# Patient Record
(demographics unavailable — no encounter records)

---

## 2025-02-22 NOTE — LETTER BODY
[Dear  ___] : Dear  [unfilled], [Consult Letter:] : I had the pleasure of evaluating your patient, [unfilled]. [Please see my note below.] : Please see my note below. [Sincerely,] : Sincerely, [FreeTextEntry3] : Vidya Crews MD, FACS

## 2025-02-22 NOTE — ASSESSMENT
[FreeTextEntry1] : 67 yo referred for an elevated PSA the patient states that his 2 brothers have prostate cancer - older brothers none  of cancer but had their cancers treated  referred for a dramatic rise in PSA denies prior biopsy denies prior imaging   QUest 2025 PSA 5.3 ng/ml / 9% UA - no RBCs 2024 PSA 2.7 ng/ml / 11% free  nl UA   IPSS 2 IIEF 19  Plan 67 yo with rising PSA family history of prostate cancer no prior imaging or biopsy - MRI of the prostate - renal and bladder US - repeat PSA - f/u to review and plan biopsy (MRI guided or standard)

## 2025-02-22 NOTE — ASSESSMENT
[FreeTextEntry1] : 69 yo referred for an elevated PSA the patient states that his 2 brothers have prostate cancer - older brothers none  of cancer but had their cancers treated  referred for a dramatic rise in PSA denies prior biopsy denies prior imaging   QUest 2025 PSA 5.3 ng/ml / 9% UA - no RBCs 2024 PSA 2.7 ng/ml / 11% free  nl UA   IPSS 2 IIEF 19  Plan 69 yo with rising PSA family history of prostate cancer no prior imaging or biopsy - MRI of the prostate - renal and bladder US - repeat PSA - f/u to review and plan biopsy (MRI guided or standard)

## 2025-02-22 NOTE — HISTORY OF PRESENT ILLNESS
[FreeTextEntry1] : 67 yo referred for an elevated PSA the patient states that his 2 brothers have prostate cancer - older brothers none  of cancer but had their cancers treated  referred for a dramatic rise in PSA denies prior biopsy denies prior imaging   QUest 2025 PSA 5.3 ng/ml / 9% UA - no RBCs 2024 PSA 2.7 ng/ml / 11% free  nl UA   IPSS 2 IIEF 19 [Urinary Retention] : no urinary retention [Urinary Urgency] : no urinary urgency [Urinary Frequency] : no urinary frequency [Nocturia] : no nocturia [Straining] : no straining [Erectile Dysfunction] : no Erectile Dysfunction [Dysuria] : no dysuria [Hematuria - Gross] : no gross hematuria

## 2025-03-16 NOTE — HISTORY OF PRESENT ILLNESS
[FreeTextEntry1] : 67 yo initially referred for an elevated PSA the patient states that his 2 brothers have prostate cancer - older brothers none  of cancer but had their cancers treated  referred for a dramatic rise in PSA here to review repeat labs and imaging   QUest 2025 PSA 4.29 ng/ml / 11% free  2025 PSA 5.3 ng/ml / 9% UA - no RBCs 2024 PSA 2.7 ng/ml / 11% free  nl UA   MRI prostate 2025 LESION: IMPRESSION: Prostate lesion as detailed above. PIRADS 4 - High (clinically significant cancer is likely to be present)  Renal and Bladder US 3/2025 FINDINGS: Right kidney: 10.5 cm. 5 mm lower pole calculus with adjacent cyst. 4 mm mid pole calculus. No hydronephrosis. Left kidney: 11.0 cm. 2.3 cm lower pole simple cyst. 5 mm nonobstructing calculus. Urinary bladder: Within normal limits.  IMPRESSION: Bilateral nonobstructing nephrolithiasis.  [Urinary Retention] : no urinary retention [Urinary Urgency] : no urinary urgency [Urinary Frequency] : no urinary frequency [Nocturia] : no nocturia [Straining] : no straining [Erectile Dysfunction] : no Erectile Dysfunction [Dysuria] : no dysuria [Hematuria - Gross] : no gross hematuria

## 2025-03-16 NOTE — ASSESSMENT
[FreeTextEntry1] : 69 yo initially referred for an elevated PSA the patient states that his 2 brothers have prostate cancer - older brothers none  of cancer but had their cancers treated  referred for a dramatic rise in PSA here to review repeat labs and imaging   QUest 2025 PSA 4.29 ng/ml / 11% free  2025 PSA 5.3 ng/ml / 9% UA - no RBCs 2024 PSA 2.7 ng/ml / 11% free  nl UA   MRI prostate 2025 LESION: IMPRESSION: Prostate lesion as detailed above. PIRADS 4 - High (clinically significant cancer is likely to be present)  Renal and Bladder US 3/2025 FINDINGS: Right kidney: 10.5 cm. 5 mm lower pole calculus with adjacent cyst. 4 mm mid pole calculus. No hydronephrosis. Left kidney: 11.0 cm. 2.3 cm lower pole simple cyst. 5 mm nonobstructing calculus. Urinary bladder: Within normal limits.  IMPRESSION: Bilateral nonobstructing nephrolithiasis.  Plan  69 yo with elevated PSA and PIRADS 4 on MRI US with bilateral renal stones and a simple cyst family history of prostate cancer no prior imaging or biopsy - MRI of the prostate - reviewed - renal and bladder US - reviewed  - repeat PSA reviewed  - MRI guided biopsy - [facility] - f/u to review post biopsy  discussed the finding on US and expressed the need for stone workup and made him aware of what to look for and what to do in cases of renal colic

## 2025-04-23 NOTE — ASSESSMENT
[FreeTextEntry1] : 67 yo initially referred for an elevated PSA the patient states that his 2 brothers have prostate cancer - older brothers none  of cancer but had their cancers treated  referred for a dramatic rise in PSA here to review his prostate biopsy  QUest 2025 PSA 4.29 ng/ml / 11% free  2025 PSA 5.3 ng/ml / 9% UA - no RBCs 2024 PSA 2.7 ng/ml / 11% free  nl UA   MRI prostate 2025 LESION: IMPRESSION: Prostate lesion as detailed above. PIRADS 4 - High (clinically significant cancer is likely to be present)  Renal and Bladder US 3/2025 FINDINGS: Right kidney: 10.5 cm. 5 mm lower pole calculus with adjacent cyst. 4 mm mid pole calculus. No hydronephrosis. Left kidney: 11.0 cm. 2.3 cm lower pole simple cyst. 5 mm nonobstructing calculus. Urinary bladder: Within normal limits.  IMPRESSION: Bilateral nonobstructing nephrolithiasis.  Biopsy - 4/10/2025 Final Diagnosis  Right medial apex prostate, needle biopsy: --Adenocarcinoma of the prostate, Mason's score 3+4=7 (Grade Group 2) involving 60% of the specimen and 1 of 1 core(s). Mason pattern 4 comprises 5% of the cancer.  Right lateral apex prostate, needle biopsy: --Adenocarcinoma of the prostate, Mason's score 3+4=7 (Grade Group 2) involving  35% of the specimen and 1 of 1 core(s). [place] pattern 4 comprises 5% of the cancer.  . ETHAN: Right posterior medial prostate, needle biopsy: -Adenocarcinoma of the prostate, Mason's score 3+4=7 (Grade Group 2) involving 20% of the specimen and 1 of 3 fragmented cores. [place] pattern 4 comprises 5% of the cancer.  biopsy finings reviewed with the patient   Plan Our discussion summarized --  The natural history of this disease was explained to the patient at length and the treatment options discussed including radical prostatectomy by open or robotic-assisted laparoscopic approach, external-beam radiotherapy, brachytherapy, and watchful waiting, active surveillance, including the probability of success and complications associated with these approaches.  With respect to AS/watchful waiting, we discussed the difficulties of estimating the extent of disease preoperatively, the risk of cancer progression, and risk that salvage might not be possible with progression. However, the favorable long-term outcomes of active surveillance in appropriately selected patients was conveyed  With respect to seed implants, we discussed risks including but not limited to cancer recurrence, exacerbation of voiding symptoms or hematuria, urinary retention, induction of 2nd malignancy, and risks of rectal symptoms or bleeding. We also discussed risks of the anesthesia including but not limited to MI, CVA, DVT, and PE.  With respect to EBRT, we discussed we discussed risks including but not limited to cancer recurrence, exacerbation of voiding symptoms or hematuria, urinary retention, incontinence, stricture of the urinary tract, induction of 2nd malignancy, and risks of rectal symptoms or bleeding. We discussed fact that rectal symptoms may be more common with EBRT than with other treatment modalities. I did convey that the risk of erectile dysfunction was likely lower with EBRT, at least in the short-term.  With radiation therapy, we discussed the difficulties in diagnosing recurrent disease at an early stage due to variability in PSA levels and the fact that most patients are not candidates for local salvage therapy when biochemical recurrence is declared. We also discussed the significant morbidity of local salvage therapy in terms of perioperative complications, erectile dysfunction and urinary incontinence.  With respect to radical prostatectomy, the pros and cons of open vs robotic-assisted laparoscopic prostatectomy were discussed. The complications of this procedure were reviewed with the patient and include (but not limited to) urinary incontinence, erectile dysfunction, infertility, anastomotic stricture, lymphocele, hemorrhage requiring transfusion, rectal, ureteral, or nerve injury, infection, cardiovascular, pulmonary, thromboembolic, and anesthetic complications. For robotic prostatectomy, the additional complications of anastomotic urine leak and small bowel obstruction from adhesions was conveyed. We also discussed the need for a urethral catheter as well as a TITA drain post-operatively.  With surgery, we also discussed the potential advantage over radiation therapy in that biochemical recurrence can be detected at a relatively earlier stage and that salvage radiotherapy is successful in controlling recurrent disease in a substantial proportion of patients. I also conveyed that salvage radiotherapy was associated with a considerably more favorable morbidity profile compared to local salvage therapies for radiorecurrent disease.  We also discussed prostate cryotherapy in detail, including aspects related to the procedure and the outcomes with respect to cancer control, urinary dysfunction, impotence, and morbidity.  All of the patient questions were answered. he favors Radiation over surveillance and surgery  he will be referred to Dr. Aburto - Radiation Oncology - f/u with me after to discuss treatment decision

## 2025-04-23 NOTE — HISTORY OF PRESENT ILLNESS
[FreeTextEntry1] : 67 yo initially referred for an elevated PSA the patient states that his 2 brothers have prostate cancer - older brothers none  of cancer but had their cancers treated  referred for a dramatic rise in PSA here to review his prostate biopsy  QUest 2025 PSA 4.29 ng/ml / 11% free  2025 PSA 5.3 ng/ml / 9% UA - no RBCs 2024 PSA 2.7 ng/ml / 11% free  nl UA   MRI prostate 2025 LESION: IMPRESSION: Prostate lesion as detailed above. PIRADS 4 - High (clinically significant cancer is likely to be present)  Renal and Bladder US 3/2025 FINDINGS: Right kidney: 10.5 cm. 5 mm lower pole calculus with adjacent cyst. 4 mm mid pole calculus. No hydronephrosis. Left kidney: 11.0 cm. 2.3 cm lower pole simple cyst. 5 mm nonobstructing calculus. Urinary bladder: Within normal limits.  IMPRESSION: Bilateral nonobstructing nephrolithiasis.  Biopsy - 4/10/2025 Final Diagnosis  Right medial apex prostate, needle biopsy: --Adenocarcinoma of the prostate, Mason's score 3+4=7 (Grade Group 2) involving 60% of the specimen and 1 of 1 core(s). Mason pattern 4 comprises 5% of the cancer.  Right lateral apex prostate, needle biopsy: --Adenocarcinoma of the prostate, Mason's score 3+4=7 (Grade Group 2) involving  35% of the specimen and 1 of 1 core(s). [place] pattern 4 comprises 5% of the cancer.  . ETHAN: Right posterior medial prostate, needle biopsy: -Adenocarcinoma of the prostate, Mason's score 3+4=7 (Grade Group 2) involving 20% of the specimen and 1 of 3 fragmented cores. [place] pattern 4 comprises 5% of the cancer.  biopsy finings reviewed with the patient  [Urinary Retention] : no urinary retention [Urinary Urgency] : no urinary urgency [Urinary Frequency] : no urinary frequency [Nocturia] : no nocturia [Straining] : no straining [Erectile Dysfunction] : no Erectile Dysfunction [Dysuria] : no dysuria [Hematuria - Gross] : no gross hematuria

## 2025-05-16 NOTE — PHYSICAL EXAM
[General Appearance - Well Developed] : well developed [General Appearance - Alert] : alert [General Appearance - In No Acute Distress] : in no acute distress [Examination Of The Oral Cavity] : the lips and gums were normal [] : no respiratory distress [Musculoskeletal - Swelling] : no joint swelling [Skin Color & Pigmentation] : normal skin color and pigmentation [No Focal Deficits] : no focal deficits [Oriented To Time, Place, And Person] : oriented to person, place, and time

## 2025-05-20 NOTE — DISEASE MANAGEMENT
[1] : T1 [c] : c [0] : M0 [0-10] : 0 -10 ng/mL [Biopsy with Fusion] : Patient had a biopsy with fusion on [7(3+4)] : Template Biopsy [place] Score: 7(3+4) [] : Patient had a Prostate MRI [4] : 4 [IIB] : IIB [BiopsyDate] : 04/25 [MeasuredProstateVolume] : 53 [TotalCores] : 13 [TotalPositiveCores] : 3 [MaxCoreInvolvement] : 20

## 2025-05-20 NOTE — HISTORY OF PRESENT ILLNESS
[FreeTextEntry1] : 68 yr old man with a family history of prostate cancer (2 older brothers) and two sisters that have had breast cancer. He has h/o elevated lipids and kidney stones. He had a rise in PSA from 2.7ng/ml in 2024 and to 5.3ng/ml in 2025. His MRI prostate showed 53 cc prostate with a PIRADS 4 lesion on right posterior lateral base peripheral zone. Prostate biopsy on 4/21/2025 showed 3 cores of Mason 7(3+4) Pca. His IPSS score is 2. He denies any nocturia. He is retired. He is independent with all ADL's.   He states he has done his own research on prostate cancer and is favoring active surveillance.   2/2025 PSA 4.29 ng/ml  1/2025 PSA 5.3 ng/ml  2/2024 PSA 2.7 ng/ml   IPSS 2  MRI prostate 2/2025 Size: 6.1 x 3.8 x 4.4 cm Volume: 53 cc Location: Right, posterior lateral (PZpl), base, peripheral zone. PIRADS 4 - High (clinically significant cancer is likely to be present) Neurovascular bundle: No evidence of neurovascular bundle invasion. Seminal vesicles: No seminal vesicle invasion. Lymph nodes: No pelvic adenopathy. Bones: No suspicious lesions identified. Urinary bladder: Within normal limits. Other: Small left fat-containing inguinal hernia.  4/21/25 biopsy 1. Right medial apex prostate, needle biopsy:--Adenocarcinoma of the prostate, Mason's score 3+4=7 (Grade Group 2) involving 60% of the specimen and 1 of 1 core(s). [place] pattern 4comprises 5% of the cancer. 2. Right medial base prostate, needle biopsy:-Benign prostatic tissue. 3. Right lateral apex prostate, needle biopsy:--Adenocarcinoma of the prostate, Mason's score 3+4=7 (Grade Group 2) involving  35% of the specimen and 1 of 1 core(s). Mason pattern 4 comprises 5% of the cancer. 4. Right lateral base prostate, needle biopsy:-Benign prostatic tissue. Moderate atrophy. Mild chronic inflammation. 5. Right lateral prostate, needle biopsy:-Benign prostatic tissue. Moderate atrophy. Mild chronic inflammation. 6. Right anterior prostate, needle biopsy:-Benign prostatic tissue. Moderate atrophy. Mild chronic inflammation. 7. Left medial apex prostate, needle biopsy:-Benign prostatic tissue. Moderate atrophy. Mild chronic inflammation. 8. Left medial base prostate, needle biopsy-Benign prostatic tissue. Moderate atrophy. Mild chronic inflammation. 9. Left lateral apex prostate, needle biopsy:-Benign prostatic tissue. Moderate atrophy. Mild chronic inflammation. 10. Left lateral base prostate, needle biopsy:-Benign prostatic tissue. Moderate atrophy. Mild chronic inflammation. 11. Left lateral prostate, needle biopsy--Benign prostatic tissue. Moderate atrophy. 12. Left anterior prostate, needle biopsy:-Benign prostatic tissue. Moderate atrophy. Mild acute and chronic inflammation.. 13. ETHAN: Right posterior medial prostate, needle biopsy:-Adenocarcinoma of the prostate, [place]'s score 3+4=7 (Grade Group 2) involving 20% of the specimen and 1 of 3 fragmented cores. [place] pattern 4 comprises 5% of the cancer.  Renal and Bladder US 3/2025 FINDINGS: Right kidney: 10.5 cm. 5 mm lower pole calculus with adjacent cyst. 4 mm mid pole calculus. No hydronephrosis. Left kidney: 11.0 cm. 2.3 cm lower pole simple cyst. 5 mm nonobstructing calculus. Urinary bladder: Within normal limits. IMPRESSION: Bilateral nonobstructing nephrolithiasis.

## 2025-05-20 NOTE — REVIEW OF SYSTEMS
[Urinary Frequency] : urinary frequency [IPSS Score (0-40): ___] : IPSS score: [unfilled] [EPIC-CP Score (0-60): ___] : EPIC-CP score: [unfilled] [Negative] : Allergic/Immunologic [Nocturia] : no nocturia [Urinary Ostomy] : no ~T urinary ostomy present [FreeTextEntry7] : colonoscopy 2yrs ago/ [FreeTextEntry8] : no nocturia

## 2025-05-20 NOTE — END OF VISIT
[FreeTextEntry3] : MD Note: I personally performed the evaluation and management services for this patient. This includes conducting the examination, assessing all conditions, and establishing the plan of care. Today, my ACP, Kala Levine, was here to observe my evaluation and management services for this patient. I agree with the documentation above, which I have reviewed and edited where appropriate. [Time Spent: ___ minutes] : I have spent [unfilled] minutes of time on the encounter which excludes teaching and separately reported services.

## 2025-05-20 NOTE — HISTORY OF PRESENT ILLNESS
[FreeTextEntry1] : 68 yr old man with a family history of prostate cancer (2 older brothers) and two sisters that have had breast cancer. He has h/o elevated lipids and kidney stones. He had a rise in PSA from 2.7ng/ml in 2024 and to 5.3ng/ml in 2025. His MRI prostate showed 53 cc prostate with a PIRADS 4 lesion on right posterior lateral base peripheral zone. Prostate biopsy on 4/21/2025 showed 3 cores of Mason 7(3+4) Pca. His IPSS score is 2. He denies any nocturia. He is retired. He is independent with all ADL's.   He states he has done his own research on prostate cancer and is favoring active surveillance.   2/2025 PSA 4.29 ng/ml  1/2025 PSA 5.3 ng/ml  2/2024 PSA 2.7 ng/ml   IPSS 2  MRI prostate 2/2025 Size: 6.1 x 3.8 x 4.4 cm Volume: 53 cc Location: Right, posterior lateral (PZpl), base, peripheral zone. PIRADS 4 - High (clinically significant cancer is likely to be present) Neurovascular bundle: No evidence of neurovascular bundle invasion. Seminal vesicles: No seminal vesicle invasion. Lymph nodes: No pelvic adenopathy. Bones: No suspicious lesions identified. Urinary bladder: Within normal limits. Other: Small left fat-containing inguinal hernia.  4/21/25 biopsy 1. Right medial apex prostate, needle biopsy:--Adenocarcinoma of the prostate, Mason's score 3+4=7 (Grade Group 2) involving 60% of the specimen and 1 of 1 core(s). [place] pattern 4comprises 5% of the cancer. 2. Right medial base prostate, needle biopsy:-Benign prostatic tissue. 3. Right lateral apex prostate, needle biopsy:--Adenocarcinoma of the prostate, Mason's score 3+4=7 (Grade Group 2) involving  35% of the specimen and 1 of 1 core(s). [place] pattern 4 comprises 5% of the cancer. 4. Right lateral base prostate, needle biopsy:-Benign prostatic tissue. Moderate atrophy. Mild chronic inflammation. 5. Right lateral prostate, needle biopsy:-Benign prostatic tissue. Moderate atrophy. Mild chronic inflammation. 6. Right anterior prostate, needle biopsy:-Benign prostatic tissue. Moderate atrophy. Mild chronic inflammation. 7. Left medial apex prostate, needle biopsy:-Benign prostatic tissue. Moderate atrophy. Mild chronic inflammation. 8. Left medial base prostate, needle biopsy-Benign prostatic tissue. Moderate atrophy. Mild chronic inflammation. 9. Left lateral apex prostate, needle biopsy:-Benign prostatic tissue. Moderate atrophy. Mild chronic inflammation. 10. Left lateral base prostate, needle biopsy:-Benign prostatic tissue. Moderate atrophy. Mild chronic inflammation. 11. Left lateral prostate, needle biopsy--Benign prostatic tissue. Moderate atrophy. 12. Left anterior prostate, needle biopsy:-Benign prostatic tissue. Moderate atrophy. Mild acute and chronic inflammation.. 13. ETHAN: Right posterior medial prostate, needle biopsy:-Adenocarcinoma of the prostate, [place]'s score 3+4=7 (Grade Group 2) involving 20% of the specimen and 1 of 3 fragmented cores. [place] pattern 4 comprises 5% of the cancer.  Renal and Bladder US 3/2025 FINDINGS: Right kidney: 10.5 cm. 5 mm lower pole calculus with adjacent cyst. 4 mm mid pole calculus. No hydronephrosis. Left kidney: 11.0 cm. 2.3 cm lower pole simple cyst. 5 mm nonobstructing calculus. Urinary bladder: Within normal limits. IMPRESSION: Bilateral nonobstructing nephrolithiasis.

## 2025-05-20 NOTE — HISTORY OF PRESENT ILLNESS
[FreeTextEntry1] : 68 yr old man with a family history of prostate cancer (2 older brothers) and two sisters that have had breast cancer. He has h/o elevated lipids and kidney stones. He had a rise in PSA from 2.7ng/ml in 2024 and to 5.3ng/ml in 2025. His MRI prostate showed 53 cc prostate with a PIRADS 4 lesion on right posterior lateral base peripheral zone. Prostate biopsy on 4/21/2025 showed 3 cores of Mason 7(3+4) Pca. His IPSS score is 2. He denies any nocturia. He is retired. He is independent with all ADL's.   He states he has done his own research on prostate cancer and is favoring active surveillance.   2/2025 PSA 4.29 ng/ml  1/2025 PSA 5.3 ng/ml  2/2024 PSA 2.7 ng/ml   IPSS 2  MRI prostate 2/2025 Size: 6.1 x 3.8 x 4.4 cm Volume: 53 cc Location: Right, posterior lateral (PZpl), base, peripheral zone. PIRADS 4 - High (clinically significant cancer is likely to be present) Neurovascular bundle: No evidence of neurovascular bundle invasion. Seminal vesicles: No seminal vesicle invasion. Lymph nodes: No pelvic adenopathy. Bones: No suspicious lesions identified. Urinary bladder: Within normal limits. Other: Small left fat-containing inguinal hernia.  4/21/25 biopsy 1. Right medial apex prostate, needle biopsy:--Adenocarcinoma of the prostate, Mason's score 3+4=7 (Grade Group 2) involving 60% of the specimen and 1 of 1 core(s). [place] pattern 4comprises 5% of the cancer. 2. Right medial base prostate, needle biopsy:-Benign prostatic tissue. 3. Right lateral apex prostate, needle biopsy:--Adenocarcinoma of the prostate, Mason's score 3+4=7 (Grade Group 2) involving  35% of the specimen and 1 of 1 core(s). [place] pattern 4 comprises 5% of the cancer. 4. Right lateral base prostate, needle biopsy:-Benign prostatic tissue. Moderate atrophy. Mild chronic inflammation. 5. Right lateral prostate, needle biopsy:-Benign prostatic tissue. Moderate atrophy. Mild chronic inflammation. 6. Right anterior prostate, needle biopsy:-Benign prostatic tissue. Moderate atrophy. Mild chronic inflammation. 7. Left medial apex prostate, needle biopsy:-Benign prostatic tissue. Moderate atrophy. Mild chronic inflammation. 8. Left medial base prostate, needle biopsy-Benign prostatic tissue. Moderate atrophy. Mild chronic inflammation. 9. Left lateral apex prostate, needle biopsy:-Benign prostatic tissue. Moderate atrophy. Mild chronic inflammation. 10. Left lateral base prostate, needle biopsy:-Benign prostatic tissue. Moderate atrophy. Mild chronic inflammation. 11. Left lateral prostate, needle biopsy--Benign prostatic tissue. Moderate atrophy. 12. Left anterior prostate, needle biopsy:-Benign prostatic tissue. Moderate atrophy. Mild acute and chronic inflammation.. 13. ETHAN: Right posterior medial prostate, needle biopsy:-Adenocarcinoma of the prostate, [place]'s score 3+4=7 (Grade Group 2) involving 20% of the specimen and 1 of 3 fragmented cores. [place] pattern 4 comprises 5% of the cancer.  Renal and Bladder US 3/2025 FINDINGS: Right kidney: 10.5 cm. 5 mm lower pole calculus with adjacent cyst. 4 mm mid pole calculus. No hydronephrosis. Left kidney: 11.0 cm. 2.3 cm lower pole simple cyst. 5 mm nonobstructing calculus. Urinary bladder: Within normal limits. IMPRESSION: Bilateral nonobstructing nephrolithiasis.

## 2025-05-20 NOTE — DISEASE MANAGEMENT
[1] : T1 [c] : c [0] : M0 [0-10] : 0 -10 ng/mL [Biopsy with Fusion] : Patient had a biopsy with fusion on [7(3+4)] : Template Biopsy [place] Score: 7(3+4) [] : Patient had a Prostate MRI [4] : 4 [IIB] : IIB [BiopsyDate] : 04/25 [MeasuredProstateVolume] : 53 [TotalCores] : 13 [TotalPositiveCores] : 3 [MaxCoreInvolvement] : 20

## 2025-05-20 NOTE — DISEASE MANAGEMENT
From: Charlie Perez  To: Kvng Navarro  Charlie   [1] : T1 [c] : c [0] : M0 [0-10] : 0 -10 ng/mL [Biopsy with Fusion] : Patient had a biopsy with fusion on [7(3+4)] : Template Biopsy [place] Score: 7(3+4) [] : Patient had a Prostate MRI [4] : 4 [IIB] : IIB [BiopsyDate] : 04/25 [MeasuredProstateVolume] : 53 [TotalCores] : 13 [TotalPositiveCores] : 3 [MaxCoreInvolvement] : 20